# Patient Record
(demographics unavailable — no encounter records)

---

## 2025-02-03 NOTE — DATA REVIEWED
[FreeTextEntry1] : Right  X-Ray Examination of the KNEE (4 views): medial and patellofemoral degenerate changes.   X-Ray Examination of the LUMBAR SPINE 2 views shows: disc space narrowing

## 2025-02-03 NOTE — PROCEDURE
[FreeTextEntry1] : right knee injection [FreeTextEntry3] : Procedure: Kenalog injection of the Right Knee joint  Indication:  Inflammation and Joint pain.  Risk, benefits and alternatives were discussed with the patient. Potential complications include bleeding and infection.  Alcohol was used to prep the area.  Ethyl chloride spray was used as a topical anesthetic.  Using sterile technique, the aspiration/injection needle was then directed from a lateral and superior aspect.  The procedure was not ultrasound guided.  A 1.5 inch 21g needle was used to inject 3 mL of 1% Lidocaine, 3 mL 0.25% Bupivacaine and 1 mL 40mg/mL triamcinolone.  A bandage was applied.  The patient tolerated the procedure well.  Complications: None.  Patient instructed to avoid strenuous activity for 2 day(s).  Follow-up in the office as needed, in 3 months for repeat injection, if pain remains unresolved, or for further concerns.  Specifically counseled regarding the signs and symptoms of potential intraarticular infection and instructed to present promptly to clinic or hospital if such signs and symptoms arise. [FreeTextEntry2] : right knee oa

## 2025-02-03 NOTE — HISTORY OF PRESENT ILLNESS
[de-identified] : Date of Injury/Onset: 4 months ago Pain: At Rest: 0 With Activity: 3 Mechanism of injury: This is NOT a Work Related Injury being treated under Worker's Compensation. This is NOT an athletic injury occurring associated with an interscholastic or organized sports team. Quality of symptoms: Improves with: Worse with: Prior treatment: n/a Prior Imaging: n/a Reports Available For Review Today: no Out of work/sport: retired   02/03/2025 CUONG is here today for evaluation of right knee. He complains of right sided lower back pain that radiates down to his knee for 1 year. He reports stiffness of right knee as well. Patient reports injury about 2 years ago, he twisted his right ankle going down the stairs. Patient denies N/T, reports some stiffness and weakness. Patient had been taking naproxen for pain relief. Patient states pain is worse when bending. Pt completed PT last summer, with mild improvement.

## 2025-02-03 NOTE — IMAGING
[de-identified] : Back / Spine:  Inspection: No erythema and ecchymosis.  Palpation: b/l lumbar paraspinal tenderness.   Range of motion:  Near full range of motion but with mild stiffness.   Strength Testing: motor exam is 5/5 throughout both lower extremities with normal tone  Neurological testing: light touch is intact throughout both lower extremities  Straight Leg Raise: neg  Babiniski test: neg bilaterally  RIGHT KNEE  Inspection:  mild effusion  Palpation: medial joint line tenderness, anterior tenderness  Knee Range of Motion:  3-125   Strength: 5/5 Quadriceps strength, 5/5 Hamstring strength  Neurological: light touch is intact throughout  Ligament Stability and Special Tests:   McMurrays: neg  Lachman: neg  Pivot Shift: neg  Posterior Drawer: neg  Valgus: neg  Varus: neg  Patella Apprehension: neg  Patella Maltracking: neg

## 2025-02-03 NOTE — DISCUSSION/SUMMARY
[Medication Risks Reviewed] : Medication risks reviewed [de-identified] : 2 issues R knee OA and L spine DDD     - We discussed their diagnosis and treatment options at length including the risks and benefits of both surgical treatment with a knee replacement and non-surgical options.  - We will continue conservative treatment with activity modification, PT, icing, weight loss, and anti-inflammatory medications.  - The patient was provided with a PT prescription to work on ROM, hip ER/abductors strengthening, quad/hamstring stretches and strengthening, and other exercises.  - The patient was advised to let pain guide the gradual advancement of activities.  - We discussed the possible of injections in the future.  - Follow up as needed in 6 weeks as to re-evaluate   We discussed their diagnosis and treatment options at length including the risks and benefits of both surgical and non-surgical options..  - We will continue conservative treatment with a course of PT and anti-inflammatory medication.  - Rx given for Medrol dose pack  - Discussed the possible side effects of medication along with the timing and frequency for taking.  - If they do not make an appropriate improvement with therapy we discussed that we will obtain and MRI at their next visit.  next visit: consider MR L spine and fu with jose f for back consider HA injections R knee

## 2025-03-17 NOTE — PROCEDURE
[FreeTextEntry1] : right knee injection [FreeTextEntry2] : right knee oa [FreeTextEntry3] : Procedure: Kenalog injection of the Right Knee joint  Indication:  Inflammation and Joint pain.  Risk, benefits and alternatives were discussed with the patient. Potential complications include bleeding and infection.  Alcohol was used to prep the area.  Ethyl chloride spray was used as a topical anesthetic.  Using sterile technique, the aspiration/injection needle was then directed from a lateral and superior aspect.  The procedure was not ultrasound guided.  A 1.5 inch 21g needle was used to inject 3 mL of 1% Lidocaine, 3 mL 0.25% Bupivacaine and 1 mL 40mg/mL triamcinolone.  A bandage was applied.  The patient tolerated the procedure well.  Complications: None.  Patient instructed to avoid strenuous activity for 2 day(s).  Follow-up in the office as needed, in 3 months for repeat injection, if pain remains unresolved, or for further concerns.  Specifically counseled regarding the signs and symptoms of potential intraarticular infection and instructed to present promptly to clinic or hospital if such signs and symptoms arise.

## 2025-03-17 NOTE — HISTORY OF PRESENT ILLNESS
[de-identified] : Date of Injury/Onset: 4 months ago Pain: At Rest: 0 With Activity: 3 Mechanism of injury: This is NOT a Work Related Injury being treated under Worker's Compensation. This is NOT an athletic injury occurring associated with an interscholastic or organized sports team. Quality of symptoms: Improves with: Worse with: Prior treatment: n/a Prior Imaging: n/a Reports Available For Review Today: no Out of work/sport: retired   02/03/2025 CUONG is here today for evaluation of right knee. He complains of right sided lower back pain that radiates down to his knee for 1 year. He reports stiffness of right knee as well. Patient reports injury about 2 years ago, he twisted his right ankle going down the stairs. Patient denies N/T, reports some stiffness and weakness. Patient had been taking naproxen for pain relief. Patient states pain is worse when bending. Pt completed PT last summer, with mild improvement.  03/17/2025 CUONG  is here today to follow up on right knee. Patient reports significant improvement with pain after CSI, reports some aching pain. Patient had not been doing PT, states he does HEP, takes meloxicam as needed.

## 2025-03-17 NOTE — DISCUSSION/SUMMARY
[Medication Risks Reviewed] : Medication risks reviewed [de-identified] : 2 issues R knee OA and L spine DDD     - We discussed their diagnosis and treatment options at length including the risks and benefits of both surgical treatment with a knee replacement and non-surgical options.  - We will continue conservative treatment with activity modification, PT, icing, weight loss, and anti-inflammatory medications.  - The patient was provided with a PT prescription to work on ROM, hip ER/abductors strengthening, quad/hamstring stretches and strengthening, and other exercises.  - The patient was advised to let pain guide the gradual advancement of activities.  - We discussed the possible of injections in the future.  - Follow up as needed in 6 weeks as to re-evaluate   We discussed their diagnosis and treatment options at length including the risks and benefits of both surgical and non-surgical options..  - We will continue conservative treatment with a course of PT and anti-inflammatory medication.  - Rx given for Medrol dose pack  - Discussed the possible side effects of medication along with the timing and frequency for taking.  - If they do not make an appropriate improvement with therapy we discussed that we will obtain and MRI at their next visit.  next visit: consider MR L spine and fu with jose f for back consider HA injections R knee *** 3.17 R knee OA CSI improved pain pt interested in HA injections discussed with pt my concern about his back and that I would recommend L spine and fu with spine partner but not interested melissa HA injections ordered  next visit: series 1 injection

## 2025-03-24 NOTE — HISTORY OF PRESENT ILLNESS
[de-identified] : Date of Injury/Onset: 4 months ago Pain: At Rest: 0 With Activity: 3 Mechanism of injury: This is NOT a Work Related Injury being treated under Worker's Compensation. This is NOT an athletic injury occurring associated with an interscholastic or organized sports team. Quality of symptoms: Improves with: Worse with: Prior treatment: n/a Prior Imaging: n/a Reports Available For Review Today: no Out of work/sport: retired   02/03/2025 CUONG is here today for evaluation of right knee. He complains of right sided lower back pain that radiates down to his knee for 1 year. He reports stiffness of right knee as well. Patient reports injury about 2 years ago, he twisted his right ankle going down the stairs. Patient denies N/T, reports some stiffness and weakness. Patient had been taking naproxen for pain relief. Patient states pain is worse when bending. Pt completed PT last summer, with mild improvement.  03/17/2025 CUONG  is here today to follow up on right knee. Patient reports significant improvement with pain after CSI, reports some aching pain. Patient had not been doing PT, states he does HEP, takes meloxicam as needed.   03/24/2025 CUONG  is here today for euflexxa inj #1 on right knee.

## 2025-03-24 NOTE — PROCEDURE
[FreeTextEntry1] : right knee injection [FreeTextEntry2] : right knee oa [FreeTextEntry3] : Procedure: Euflexxa injection of the Knee joint right knee #2 of 3   Indication:  Inflammation and Joint pain.  Risk, benefits and alternatives were discussed with the patient. Potential complications include bleeding, infection and allergic reaction. Verbal consent was obtained prior to the procedure.  Alcohol was used to prep the area.  Ethyl chloride spray was used as a topical anesthetic. Using sterile technique, the aspiration/injection needle was then directed from a lateral and superior aspect. The procedure was ultrasound guided.  A 21 G 1.5 inch needle was used to inject 2 mL of Euflexxa #1 of 3. A bandage was applied.  The patient tolerated the procedure well.  Complications: None.  Patient instructed to avoid strenuous activity for 2 day(s).  Follow-up for repeat injection in 1-2 weeks, or following 3rd injection in 4-6 months; if pain is unresolved; or for further concerns.   Specifically discussed signs and symptoms of aseptic synovitis as well as septic arthritis, instructed patient to immediately present to the clinic (if open), or to an emergency room if these occur for further evaluation.

## 2025-03-24 NOTE — IMAGING
[de-identified] : Back / Spine:  Inspection: No erythema and ecchymosis.  Palpation: b/l lumbar paraspinal tenderness.   Range of motion:  Near full range of motion but with mild stiffness.   Strength Testing: motor exam is 5/5 throughout both lower extremities with normal tone  Neurological testing: light touch is intact throughout both lower extremities  Straight Leg Raise: neg  Babiniski test: neg bilaterally  RIGHT KNEE  Inspection:  mild effusion  Palpation: medial joint line tenderness, anterior tenderness  Knee Range of Motion:  3-125   Strength: 5/5 Quadriceps strength, 5/5 Hamstring strength  Neurological: light touch is intact throughout  Ligament Stability and Special Tests:   McMurrays: neg  Lachman: neg  Pivot Shift: neg  Posterior Drawer: neg  Valgus: neg  Varus: neg  Patella Apprehension: neg  Patella Maltracking: neg

## 2025-03-24 NOTE — DISCUSSION/SUMMARY
[Medication Risks Reviewed] : Medication risks reviewed [de-identified] : 2 issues R knee OA and L spine DDD     - We discussed their diagnosis and treatment options at length including the risks and benefits of both surgical treatment with a knee replacement and non-surgical options.  - We will continue conservative treatment with activity modification, PT, icing, weight loss, and anti-inflammatory medications.  - The patient was provided with a PT prescription to work on ROM, hip ER/abductors strengthening, quad/hamstring stretches and strengthening, and other exercises.  - The patient was advised to let pain guide the gradual advancement of activities.  - We discussed the possible of injections in the future.  - Follow up as needed in 6 weeks as to re-evaluate   We discussed their diagnosis and treatment options at length including the risks and benefits of both surgical and non-surgical options..  - We will continue conservative treatment with a course of PT and anti-inflammatory medication.  - Rx given for Medrol dose pack  - Discussed the possible side effects of medication along with the timing and frequency for taking.  - If they do not make an appropriate improvement with therapy we discussed that we will obtain and MRI at their next visit.  next visit: consider MR L spine and fu with jose f for back consider HA injections R knee *** 3.17 R knee OA CSI improved pain pt interested in HA injections discussed with pt my concern about his back and that I would recommend L spine and fu with spine partner but not interested melissa HA injections ordered  3/24/2025 f/u for right knee OA scheduled Euflexxa series 1 of 3 f/u in 1 week for #2 of 3   next visit: series 2 injection

## 2025-04-03 NOTE — IMAGING
[de-identified] : Back / Spine:  Inspection: No erythema and ecchymosis.  Palpation: b/l lumbar paraspinal tenderness.   Range of motion:  Near full range of motion but with mild stiffness.   Strength Testing: motor exam is 5/5 throughout both lower extremities with normal tone  Neurological testing: light touch is intact throughout both lower extremities  Straight Leg Raise: neg  Babiniski test: neg bilaterally  RIGHT KNEE  Inspection:  mild effusion  Palpation: medial joint line tenderness, anterior tenderness  Knee Range of Motion:  3-125   Strength: 5/5 Quadriceps strength, 5/5 Hamstring strength  Neurological: light touch is intact throughout  Ligament Stability and Special Tests:   McMurrays: neg  Lachman: neg  Pivot Shift: neg  Posterior Drawer: neg  Valgus: neg  Varus: neg  Patella Apprehension: neg  Patella Maltracking: neg

## 2025-04-03 NOTE — HISTORY OF PRESENT ILLNESS
[de-identified] : Date of Injury/Onset: 4 months ago Pain: At Rest: 0 With Activity: 3 Mechanism of injury: This is NOT a Work Related Injury being treated under Worker's Compensation. This is NOT an athletic injury occurring associated with an interscholastic or organized sports team. Quality of symptoms: Improves with: Worse with: Prior treatment: n/a Prior Imaging: n/a Reports Available For Review Today: no Out of work/sport: retired   02/03/2025 CUONG is here today for evaluation of right knee. He complains of right sided lower back pain that radiates down to his knee for 1 year. He reports stiffness of right knee as well. Patient reports injury about 2 years ago, he twisted his right ankle going down the stairs. Patient denies N/T, reports some stiffness and weakness. Patient had been taking naproxen for pain relief. Patient states pain is worse when bending. Pt completed PT last summer, with mild improvement.  03/17/2025 CUONG  is here today to follow up on right knee. Patient reports significant improvement with pain after CSI, reports some aching pain. Patient had not been doing PT, states he does HEP, takes meloxicam as needed.   03/24/2025 CUONG  is here today for euflexxa inj #1 on right knee.  04/03/2025 CUONG  is here today for euflexxa inj #2 on right knee. well, tolerated.

## 2025-04-03 NOTE — DISCUSSION/SUMMARY
[Medication Risks Reviewed] : Medication risks reviewed [de-identified] : 2 issues R knee OA and L spine DDD     - We discussed their diagnosis and treatment options at length including the risks and benefits of both surgical treatment with a knee replacement and non-surgical options.  - We will continue conservative treatment with activity modification, PT, icing, weight loss, and anti-inflammatory medications.  - The patient was provided with a PT prescription to work on ROM, hip ER/abductors strengthening, quad/hamstring stretches and strengthening, and other exercises.  - The patient was advised to let pain guide the gradual advancement of activities.  - We discussed the possible of injections in the future.  - Follow up as needed in 6 weeks as to re-evaluate   We discussed their diagnosis and treatment options at length including the risks and benefits of both surgical and non-surgical options..  - We will continue conservative treatment with a course of PT and anti-inflammatory medication.  - Rx given for Medrol dose pack  - Discussed the possible side effects of medication along with the timing and frequency for taking.  - If they do not make an appropriate improvement with therapy we discussed that we will obtain and MRI at their next visit.  next visit: consider MR L spine and fu with jose f for back consider HA injections R knee *** 3.17 R knee OA CSI improved pain pt interested in HA injections discussed with pt my concern about his back and that I would recommend L spine and fu with spine partner but not interested melissa HA injections ordered  3/24/2025 f/u for right knee OA scheduled Euflexxa series 1 of 3 f/u in 1 week for #2 of 3   4/3/2025 f/u for right knee OA scheduled Euflexxa series 2 of 3 f/u in 1 week for #3 of 3   next visit: series 3 injection

## 2025-04-03 NOTE — PROCEDURE
[FreeTextEntry1] : right knee injection [FreeTextEntry2] : right knee oa [FreeTextEntry3] : Procedure: Euflexxa injection of the Knee joint right knee #2 of 3   Indication:  Inflammation and Joint pain.  Risk, benefits and alternatives were discussed with the patient. Potential complications include bleeding, infection and allergic reaction. Verbal consent was obtained prior to the procedure.  Alcohol was used to prep the area.  Ethyl chloride spray was used as a topical anesthetic. Using sterile technique, the aspiration/injection needle was then directed from a lateral and superior aspect. The procedure was ultrasound guided.  A 21 G 1.5 inch needle was used to inject 2 mL of Euflexxa #2 of 3. A bandage was applied.  The patient tolerated the procedure well.  Complications: None.  Patient instructed to avoid strenuous activity for 2 day(s).  Follow-up for repeat injection in 1-2 weeks, or following 3rd injection in 4-6 months; if pain is unresolved; or for further concerns.   Specifically discussed signs and symptoms of aseptic synovitis as well as septic arthritis, instructed patient to immediately present to the clinic (if open), or to an emergency room if these occur for further evaluation.

## 2025-04-11 NOTE — DISCUSSION/SUMMARY
[Medication Risks Reviewed] : Medication risks reviewed [de-identified] : 2 issues R knee OA and L spine DDD     - We discussed their diagnosis and treatment options at length including the risks and benefits of both surgical treatment with a knee replacement and non-surgical options.  - We will continue conservative treatment with activity modification, PT, icing, weight loss, and anti-inflammatory medications.  - The patient was provided with a PT prescription to work on ROM, hip ER/abductors strengthening, quad/hamstring stretches and strengthening, and other exercises.  - The patient was advised to let pain guide the gradual advancement of activities.  - We discussed the possible of injections in the future.  - Follow up as needed in 6 weeks as to re-evaluate   We discussed their diagnosis and treatment options at length including the risks and benefits of both surgical and non-surgical options..  - We will continue conservative treatment with a course of PT and anti-inflammatory medication.  - Rx given for Medrol dose pack  - Discussed the possible side effects of medication along with the timing and frequency for taking.  - If they do not make an appropriate improvement with therapy we discussed that we will obtain and MRI at their next visit.  next visit: consider MR L spine and fu with jose f for back consider HA injections R knee *** 3.17 R knee OA CSI improved pain pt interested in HA injections discussed with pt my concern about his back and that I would recommend L spine and fu with spine partner but not interested melissa HA injections ordered  3/24/2025 f/u for right knee OA scheduled Euflexxa series 1 of 3 f/u in 1 week for #2 of 3   4/3/2025 f/u for right knee OA scheduled Euflexxa series 2 of 3 f/u in 1 week for #3 of 3   next visit: series 3 injection  4/11/25 f/u for right knee OA scheduled Euflexxa series 3 of 3 f/u 6 weeks

## 2025-04-11 NOTE — IMAGING
[de-identified] : Back / Spine:  Inspection: No erythema and ecchymosis.  Palpation: b/l lumbar paraspinal tenderness.   Range of motion:  Near full range of motion but with mild stiffness.   Strength Testing: motor exam is 5/5 throughout both lower extremities with normal tone  Neurological testing: light touch is intact throughout both lower extremities  Straight Leg Raise: neg  Babiniski test: neg bilaterally  RIGHT KNEE  Inspection:  mild effusion  Palpation: medial joint line tenderness, anterior tenderness  Knee Range of Motion:  3-125   Strength: 5/5 Quadriceps strength, 5/5 Hamstring strength  Neurological: light touch is intact throughout  Ligament Stability and Special Tests:   McMurrays: neg  Lachman: neg  Pivot Shift: neg  Posterior Drawer: neg  Valgus: neg  Varus: neg  Patella Apprehension: neg  Patella Maltracking: neg

## 2025-04-11 NOTE — PROCEDURE
[FreeTextEntry1] : right knee injection [FreeTextEntry2] : right knee oa [FreeTextEntry3] : Procedure: Euflexxa injection of the Knee joint right knee #3 of 3   Indication:  Inflammation and Joint pain.  Risk, benefits and alternatives were discussed with the patient. Potential complications include bleeding, infection and allergic reaction. Verbal consent was obtained prior to the procedure.  Alcohol was used to prep the area.  Ethyl chloride spray was used as a topical anesthetic. Using sterile technique, the aspiration/injection needle was then directed from a lateral and superior aspect. The procedure was ultrasound guided.  A 21 G 1.5 inch needle was used to inject 2 mL of Euflexxa #2 of 3. A bandage was applied.  The patient tolerated the procedure well.  Complications: None.  Patient instructed to avoid strenuous activity for 2 day(s).  Follow-up for repeat injection in 1-2 weeks, or following 3rd injection in 4-6 months; if pain is unresolved; or for further concerns.   Specifically discussed signs and symptoms of aseptic synovitis as well as septic arthritis, instructed patient to immediately present to the clinic (if open), or to an emergency room if these occur for further evaluation.

## 2025-04-11 NOTE — HISTORY OF PRESENT ILLNESS
[de-identified] : Date of Injury/Onset: 4 months ago Pain: At Rest: 0 With Activity: 3 Mechanism of injury: This is NOT a Work Related Injury being treated under Worker's Compensation. This is NOT an athletic injury occurring associated with an interscholastic or organized sports team. Quality of symptoms: Improves with: Worse with: Prior treatment: n/a Prior Imaging: n/a Reports Available For Review Today: no Out of work/sport: retired   02/03/2025 CUONG is here today for evaluation of right knee. He complains of right sided lower back pain that radiates down to his knee for 1 year. He reports stiffness of right knee as well. Patient reports injury about 2 years ago, he twisted his right ankle going down the stairs. Patient denies N/T, reports some stiffness and weakness. Patient had been taking naproxen for pain relief. Patient states pain is worse when bending. Pt completed PT last summer, with mild improvement.  03/17/2025 CUONG  is here today to follow up on right knee. Patient reports significant improvement with pain after CSI, reports some aching pain. Patient had not been doing PT, states he does HEP, takes meloxicam as needed.   03/24/2025 CUONG  is here today for euflexxa inj #1 on right knee.  04/03/2025 CUONG  is here today for euflexxa inj #2 on right knee. well, tolerated.   04/11/2025 CUONG  is here today for euflexxa inj #3.